# Patient Record
Sex: FEMALE | URBAN - METROPOLITAN AREA
[De-identification: names, ages, dates, MRNs, and addresses within clinical notes are randomized per-mention and may not be internally consistent; named-entity substitution may affect disease eponyms.]

---

## 2023-10-26 ENCOUNTER — TELEPHONE (OUTPATIENT)
Dept: SURGERY | Age: 48
End: 2023-10-26

## 2023-10-26 RX ORDER — DICYCLOMINE HYDROCHLORIDE 10 MG/1
10 CAPSULE ORAL 2 TIMES DAILY
Qty: 2 CAPSULE | Refills: 0 | Status: SHIPPED | OUTPATIENT
Start: 2023-10-26

## 2023-11-15 ENCOUNTER — TELEPHONE (OUTPATIENT)
Dept: SURGERY | Age: 48
End: 2023-11-15

## 2023-11-15 NOTE — TELEPHONE ENCOUNTER
I called and informed patient. She states she has an appt tomorrow at the [facility] clinic, and will keep her apt because she wants to discuss with him first. Surgery sheet faxed to [facility].

## 2023-11-15 NOTE — TELEPHONE ENCOUNTER
Tell her MRI showed liver lesions to be benign incidental cysts. No worries. Ok to schedule gallbladder surgery. Scheduling sheet for [facility] filled out.

## 2023-11-16 ENCOUNTER — OUTSIDE SERVICES (OUTPATIENT)
Dept: SURGERY | Age: 48
End: 2023-11-16
Payer: COMMERCIAL

## 2023-11-16 DIAGNOSIS — K80.10 CHRONIC CALCULOUS CHOLECYSTITIS: Primary | ICD-10-CM

## 2023-11-16 PROCEDURE — 99213 OFFICE O/P EST LOW 20 MIN: CPT | Performed by: SURGERY

## 2023-11-30 ENCOUNTER — OUTSIDE SERVICES (OUTPATIENT)
Dept: SURGERY | Age: 48
End: 2023-11-30

## 2023-11-30 DIAGNOSIS — K80.10 CHRONIC CALCULOUS CHOLECYSTITIS: Primary | ICD-10-CM

## 2023-11-30 LAB
ALBUMIN: 3.8 G/DL (ref 3.5–5)
ALP BLD-CCNC: 39 U/L (ref 38–126)
ALT SERPL-CCNC: 21 U/L (ref 0–34)
AST SERPL-CCNC: 23 U/L (ref 14–36)
BILIRUB SERPL-MCNC: 0.6 MG/DL (ref 0.2–1.3)
BILIRUBIN DIRECT: 0 MG/DL (ref 0–0.3)
BILIRUBIN, INDIRECT: 0.5 MG/DL (ref 0–1.1)
TOTAL PROTEIN: 6.8 G/DL (ref 6.3–8.2)

## 2023-12-28 NOTE — PROGRESS NOTES
Outside service performed at [facility] with date of service 11/16/2023. Clinic note located in the media section.

## 2024-03-14 ENCOUNTER — OUTSIDE SERVICES (OUTPATIENT)
Dept: SURGERY | Age: 49
End: 2024-03-14

## 2024-03-14 DIAGNOSIS — Z09 SURGICAL FOLLOW-UP CARE: Primary | ICD-10-CM

## 2024-03-14 PROCEDURE — 99024 POSTOP FOLLOW-UP VISIT: CPT | Performed by: SURGERY

## 2024-03-28 ENCOUNTER — TELEPHONE (OUTPATIENT)
Dept: SURGERY | Age: 49
End: 2024-03-28

## 2024-03-28 NOTE — TELEPHONE ENCOUNTER
Tell her sorry for the delay, but labs were not sent to me like usual.  I reviewed them and they were overall fine.  Pancreas blood test was slightly elevated but not much.  Plan remains the same to get opinion of GI doctor and consider upper endoscopy and colonoscopy.